# Patient Record
Sex: FEMALE | Race: WHITE | Employment: UNEMPLOYED | URBAN - METROPOLITAN AREA
[De-identification: names, ages, dates, MRNs, and addresses within clinical notes are randomized per-mention and may not be internally consistent; named-entity substitution may affect disease eponyms.]

---

## 2020-11-30 ENCOUNTER — HOSPITAL ENCOUNTER (EMERGENCY)
Age: 40
Discharge: HOME | End: 2020-11-30
Attending: EMERGENCY MEDICINE
Payer: COMMERCIAL

## 2020-11-30 VITALS
HEART RATE: 85 BPM | WEIGHT: 220 LBS | SYSTOLIC BLOOD PRESSURE: 141 MMHG | TEMPERATURE: 97.8 F | HEIGHT: 67 IN | OXYGEN SATURATION: 99 % | RESPIRATION RATE: 16 BRPM | DIASTOLIC BLOOD PRESSURE: 70 MMHG | BODY MASS INDEX: 34.53 KG/M2

## 2020-11-30 DIAGNOSIS — J06.9 VIRAL UPPER RESPIRATORY INFECTION: Primary | ICD-10-CM

## 2020-11-30 LAB
BASOPHILS # BLD: 0 K/UL (ref 0–0.2)
BASOPHILS NFR BLD: 1 % (ref 0–2)
DIFFERENTIAL METHOD BLD: ABNORMAL
EOSINOPHIL # BLD: 0.1 K/UL (ref 0–0.8)
EOSINOPHIL NFR BLD: 2 % (ref 0.5–7.8)
ERYTHROCYTE [DISTWIDTH] IN BLOOD BY AUTOMATED COUNT: 14.5 % (ref 11.9–14.6)
HCT VFR BLD AUTO: 40 % (ref 35.8–46.3)
HGB BLD-MCNC: 12.9 G/DL (ref 11.7–15.4)
IMM GRANULOCYTES # BLD AUTO: 0 K/UL (ref 0–0.5)
IMM GRANULOCYTES NFR BLD AUTO: 0 % (ref 0–5)
LYMPHOCYTES # BLD: 1.2 K/UL (ref 0.5–4.6)
LYMPHOCYTES NFR BLD: 25 % (ref 13–44)
MCH RBC QN AUTO: 25.8 PG (ref 26.1–32.9)
MCHC RBC AUTO-ENTMCNC: 32.3 G/DL (ref 31.4–35)
MCV RBC AUTO: 80 FL (ref 79.6–97.8)
MONOCYTES # BLD: 0.8 K/UL (ref 0.1–1.3)
MONOCYTES NFR BLD: 16 % (ref 4–12)
NEUTS SEG # BLD: 2.7 K/UL (ref 1.7–8.2)
NEUTS SEG NFR BLD: 56 % (ref 43–78)
NRBC # BLD: 0 K/UL (ref 0–0.2)
PLATELET # BLD AUTO: 217 K/UL (ref 150–450)
PLATELET COMMENTS,PCOM: ABNORMAL
PMV BLD AUTO: 12.2 FL (ref 9.4–12.3)
RBC # BLD AUTO: 5 M/UL (ref 4.05–5.2)
RBC MORPH BLD: ABNORMAL
WBC # BLD AUTO: 4.8 K/UL (ref 4.3–11.1)
WBC MORPH BLD: ABNORMAL

## 2020-11-30 PROCEDURE — 99283 EMERGENCY DEPT VISIT LOW MDM: CPT

## 2020-11-30 PROCEDURE — 85025 COMPLETE CBC W/AUTO DIFF WBC: CPT

## 2020-11-30 RX ORDER — DOXYCYCLINE HYCLATE 100 MG
100 TABLET ORAL 2 TIMES DAILY
Qty: 20 TAB | Refills: 0 | Status: SHIPPED | OUTPATIENT
Start: 2020-11-30 | End: 2020-12-10

## 2020-11-30 NOTE — DISCHARGE INSTRUCTIONS
This is likely a viral sinusitis. We can try a course of doxycycline.   If you decide you would like to be Covid tested there is a free [address] [facility] clinic across from [facility] on [address]

## 2020-11-30 NOTE — ED NOTES
I have reviewed discharge instructions with the patient. The patient verbalized understanding. Patient left ED via Discharge Method: ambulatory to Home with self    Opportunity for questions and clarification provided. Patient given 1 scripts. To continue your aftercare when you leave the hospital, you may receive an automated call from our care team to check in on how you are doing. This is a free service and part of our promise to provide the best care and service to meet your aftercare needs.  If you have questions, or wish to unsubscribe from this service please call [phone]. Thank you for Choosing our [address] [place] [address] Emergency Department.

## 2020-11-30 NOTE — ED PROVIDER NOTES
67-year-old female presenting for evaluation of sinus congestion, headache fatigue. She also has some muscle aches. Taking Tylenol and Motrin with good relief. No fevers at home. 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 No fever sore throat nausea vomiting. No medical history. The history is provided by the patient. Generalized Body Aches   This is a new problem. The current episode started yesterday. The problem occurs constantly. The problem has not changed since onset. Associated symptoms include headaches. Pertinent negatives include no chest pain, no abdominal pain and no shortness of breath. Nothing aggravates the symptoms. Nothing relieves the symptoms. She has tried acetaminophen for the symptoms. The treatment provided moderate relief. No past medical history on file. No past surgical history on file.       Family History:   Problem Relation Age of Onset    Hypertension Mother     Hypertension Father        Social History     Socioeconomic History    Marital status: SINGLE     Spouse name: Not on file    Number of children: Not on file    Years of education: Not on file    Highest education level: Not on file   Occupational History    Not on file   Social Needs    Financial resource strain: Not on file    Food insecurity     Worry: Not on file     Inability: Not on file    Transportation needs     Medical: Not on file     Non-medical: Not on file   Tobacco Use    Smoking status: Never Smoker   Substance and Sexual Activity    Alcohol use: Yes     Comment: OCC    Drug use: No    Sexual activity: Yes   Lifestyle    Physical activity     Days per week: Not on file     Minutes per session: Not on file    Stress: Not on file   Relationships    Social connections     Talks on phone: Not on file     Gets together: Not on file     Attends Zoroastrianism service: Not on file     Active member of club or organization: Not on file     Attends meetings of clubs or organizations: Not on file     Relationship status: Not on file    Intimate partner violence     Fear of current or ex partner: Not on file     Emotionally abused: Not on file     Physically abused: Not on file     Forced sexual activity: Not on file   Other Topics Concern    Not on file   Social History Narrative    Not on file         ALLERGIES: Amoxicillin and Penicillins    Review of Systems   HENT: Positive for congestion. Respiratory: Negative for shortness of breath. Cardiovascular: Negative for chest pain. Gastrointestinal: Negative for abdominal pain. Neurological: Positive for headaches. All other systems reviewed and are negative. Vitals:    11/30/20 0959   BP: 120/75   Pulse: 90   Resp: 16   Temp: 97.8 °F (36.6 °C)   SpO2: 97%   Weight: 99.8 kg (220 lb)   Height: 5' 7\" (1.702 m)            Physical Exam  Vitals signs and nursing note reviewed. Constitutional:       Appearance: She is well-developed. HENT:      Head: Normocephalic and atraumatic. Nose: Congestion present. Eyes:      Conjunctiva/sclera: Conjunctivae normal.      Pupils: Pupils are equal, round, and reactive to light. Neck:      Musculoskeletal: Neck supple. Cardiovascular:      Rate and Rhythm: Normal rate and regular rhythm. Pulmonary:      Effort: Pulmonary effort is normal.      Breath sounds: Normal breath sounds. Abdominal:      General: Bowel sounds are normal.      Palpations: Abdomen is soft. Musculoskeletal: Normal range of motion. Skin:     General: Skin is warm and dry. Neurological:      Mental Status: She is alert and oriented to person, place, and time. MDM  Number of Diagnoses or Management Options  Viral upper respiratory infection:   Diagnosis management comments: 78-year-old female presenting for sinus congestion. No other systemic symptoms such as fevers or chills. No known COVID-19 exposure. Physical exam is reassuring.   Likely viral upper respiratory infection. Amount and/or Complexity of Data Reviewed  Clinical lab tests: ordered and reviewed (Results for orders placed or performed during the hospital encounter of 11/30/20  -CBC WITH AUTOMATED DIFF       Result                      Value             Ref Range           WBC                         4.8               4.3 - 11.1 K*       RBC                         5.00              4.05 - 5.2 M*       HGB                         12.9              11.7 - 15.4 *       HCT                         40.0              35.8 - 46.3 %       MCV                         80.0              79.6 - 97.8 *       MCH                         25.8 (L)          26.1 - 32.9 *       MCHC                        32.3              31.4 - 35.0 *       RDW                         14.5              11.9 - 14.6 %       PLATELET                    217               150 - 450 K/*       MPV                         12.2              9.4 - 12.3 FL       ABSOLUTE NRBC               0.00              0.0 - 0.2 K/*       NEUTROPHILS                 56                43 - 78 %           LYMPHOCYTES                 25                13 - 44 %           MONOCYTES                   16 (H)            4.0 - 12.0 %        EOSINOPHILS                 2                 0.5 - 7.8 %         BASOPHILS                   1                 0.0 - 2.0 %         IMMATURE GRANULOCYTES       0                 0.0 - 5.0 %         ABS. NEUTROPHILS            2.7               1.7 - 8.2 K/*       ABS. LYMPHOCYTES            1.2               0.5 - 4.6 K/*       ABS. MONOCYTES              0.8               0.1 - 1.3 K/*       ABS. EOSINOPHILS            0.1               0.0 - 0.8 K/*       ABS. BASOPHILS              0.0               0.0 - 0.2 K/*       ABS. IMM.  GRANS.            0.0               0.0 - 0.5 K/*       RBC COMMENTS                                                  NORMOCYTIC/NORMOCHROMIC       WBC COMMENTS Result Confirmed By Smear       PLATELET COMMENTS                                             PLATELET AGGREGATES PRESENT       DF                          AUTOMATED                        )    Risk of Complications, Morbidity, and/or Mortality  Presenting problems: moderate  Diagnostic procedures: moderate  Management options: moderate  General comments: I personally reviewed the patient's vital signs, laboratory tests, and/or radiological findings. I discussed these findings with the patient and their significance. I answered all questions and gave the patient clear return precautions.   The patient was discharged from the emergency department in stable condition        Patient Progress  Patient progress: improved         Procedures

## 2020-11-30 NOTE — LETTER
[facility] EMERGENCY DEPT 
[address] [facility] [facility] 
[phone] Work/School Note Date: 11/30/2020 To Whom It May concern: 
 
Venita Mckee was seen and treated today in the emergency room by the following provider(s): 
Attending Provider: Oneyda Montero MD. Venita Mckee may return to work on 12/1/20. Sincerely, Lisa Slaughter

## 2020-11-30 NOTE — ED TRIAGE NOTES
Pt ambulatory to triage. Pt said started feeling bad yesterday with body aches and headache with some intermittent nausea. Unsure if it is sinus infection. Can still smell and taste.